# Patient Record
(demographics unavailable — no encounter records)

---

## 2024-11-04 NOTE — HISTORY OF PRESENT ILLNESS
[FreeTextEntry1] : LMP 10/25 37yo  here for wellness.  Had miscarriage at 2-3 months in 2024 went to the ED and was told everything was out, no D&C. Periods are usually every 32-33 x 5d, most recently lasted 8 days. Considering TTC again. Did not have issues conceiving the first time.   HCM - hx HPV in   POB: FT   and 2019  (boys),

## 2024-11-04 NOTE — PLAN
[FreeTextEntry1] : Pap done, pelvic sono given. Discussed may ttc again, already on PNV. If not pregnant in 6mo to CBO.  Patient screened for depression - no signs of clinical depression. PHQ-9 scores reviewed over the course of the visit 5-10minutes of face to face time. Follow up with changes in mood including other symptoms of anxiety.

## 2024-11-04 NOTE — PHYSICAL EXAM
[Chaperone Declined] : Patient declined chaperone [Appropriately responsive] : appropriately responsive [Alert] : alert [No Acute Distress] : no acute distress [Soft] : soft [Non-tender] : non-tender [Non-distended] : non-distended [No HSM] : No HSM [No Lesions] : no lesions [No Mass] : no mass [Oriented x3] : oriented x3 [Examination Of The Breasts] : a normal appearance [No Masses] : no breast masses were palpable [Labia Majora] : normal [Labia Minora] : normal [Normal] : normal [Uterine Adnexae] : normal [FreeTextEntry6] : L pelvic pain appreciated on exam

## 2025-01-24 NOTE — HISTORY OF PRESENT ILLNESS
[FreeTextEntry1] : LMP   37yo  here for amenorrhea and +UPT, would be at 10 weeks. Doing well, some nausea.  Routine - FT   and  (boys- largest 10lb), SAB no D&C - pap 2024 nilm/hpv neg

## 2025-01-24 NOTE — PROCEDURE
[Transabdominal OB Sonogram] : Transabdominal OB Sonogram [Intrauterine Pregnancy] : intrauterine pregnancy [Yolk Sac] : yolk sac present [Fetal Heart] : fetal heart present [Date: ___] : Date: [unfilled] [Current GA by Sonogram: ___ (wks)] : Current GA by Sonogram: [unfilled]Uwks [___ day(s)] : [unfilled] days [Transvaginal OB Sonogram WNL] : Transvaginal OB Sonogram WNL [FreeTextEntry1] : CRL c/w dates, + FHTs